# Patient Record
Sex: FEMALE | Race: OTHER | NOT HISPANIC OR LATINO | Employment: FULL TIME | URBAN - METROPOLITAN AREA
[De-identification: names, ages, dates, MRNs, and addresses within clinical notes are randomized per-mention and may not be internally consistent; named-entity substitution may affect disease eponyms.]

---

## 2025-04-28 ENCOUNTER — ANCILLARY PROCEDURE (OUTPATIENT)
Dept: URGENT CARE | Age: 30
End: 2025-04-28
Payer: COMMERCIAL

## 2025-04-28 ENCOUNTER — OFFICE VISIT (OUTPATIENT)
Dept: URGENT CARE | Age: 30
End: 2025-04-28
Payer: COMMERCIAL

## 2025-04-28 ENCOUNTER — OFFICE VISIT (OUTPATIENT)
Dept: ORTHOPEDIC SURGERY | Facility: CLINIC | Age: 30
End: 2025-04-28
Payer: COMMERCIAL

## 2025-04-28 VITALS
OXYGEN SATURATION: 99 % | SYSTOLIC BLOOD PRESSURE: 104 MMHG | DIASTOLIC BLOOD PRESSURE: 69 MMHG | WEIGHT: 133.4 LBS | HEART RATE: 73 BPM | BODY MASS INDEX: 24.4 KG/M2

## 2025-04-28 DIAGNOSIS — S59.901A INJURY OF RIGHT ELBOW, INITIAL ENCOUNTER: ICD-10-CM

## 2025-04-28 DIAGNOSIS — S42.401A CLOSED FRACTURE OF RIGHT ELBOW, INITIAL ENCOUNTER: Primary | ICD-10-CM

## 2025-04-28 DIAGNOSIS — S52.125M: Primary | ICD-10-CM

## 2025-04-28 PROCEDURE — L3670 SO ACRO/CLAV CAN WEB PRE OTS: HCPCS | Performed by: ORTHOPAEDIC SURGERY

## 2025-04-28 PROCEDURE — 99204 OFFICE O/P NEW MOD 45 MIN: CPT | Performed by: ORTHOPAEDIC SURGERY

## 2025-04-28 PROCEDURE — A4565 SLINGS: HCPCS

## 2025-04-28 PROCEDURE — 1036F TOBACCO NON-USER: CPT | Performed by: ORTHOPAEDIC SURGERY

## 2025-04-28 PROCEDURE — 73080 X-RAY EXAM OF ELBOW: CPT | Mod: RIGHT SIDE

## 2025-04-28 PROCEDURE — 99214 OFFICE O/P EST MOD 30 MIN: CPT

## 2025-04-28 PROCEDURE — 99214 OFFICE O/P EST MOD 30 MIN: CPT | Performed by: ORTHOPAEDIC SURGERY

## 2025-04-28 RX ORDER — IBUPROFEN 800 MG/1
800 TABLET ORAL EVERY 8 HOURS PRN
Qty: 30 TABLET | Refills: 0 | Status: SHIPPED | OUTPATIENT
Start: 2025-04-28 | End: 2025-05-08

## 2025-04-28 NOTE — PROGRESS NOTES
"Chief Complaint   Chief Complaint   Patient presents with    Right Elbow - Pain         HPI:      Cody Morley is a pleasant 30 y.o. year-old female who is seen today for right elbow injury occurred on Wednesday no treatment so far fell on an outstretched hand moderately uncomfortable she is right-hand dominant works in a lab    Review of Systems all other body systems have been reviewed and are negative for complaint.  See intake sheet was reviewed and scanned into the chart    There were no vitals filed for this visit.    Medical History[1]  Problem List[2]    Medication Documentation Review Audit       Reviewed by Dennis Cedillo MA (Medical Assistant) on 04/28/25 at 1358      Medication Order Taking? Sig Documenting Provider Last Dose Status   ibuprofen 800 mg tablet [id number]  Take 1 tablet (800 mg) by mouth every 8 hours if needed for moderate pain (4 - 6) for up to 10 days. Skyler Henao PA-C  Active                    RX Allergies[3]    Social History     Socioeconomic History    Marital status: Single     Spouse name: Not on file    Number of children: Not on file    Years of education: Not on file    Highest education level: Not on file   Occupational History    Not on file   Tobacco Use    Smoking status: Never    Smokeless tobacco: Never   Substance and Sexual Activity    Alcohol use: Not on file    Drug use: Not on file    Sexual activity: Not on file   Other Topics Concern    Not on file   Social History Narrative    Not on file     Social Drivers of Health     Financial Resource Strain: Not on file   Food Insecurity: Not on file   Transportation Needs: Not on file   Physical Activity: Not on file   Stress: Not on file   Social Connections: Not on file   Intimate Partner Violence: Not on file   Housing Stability: Not on file       Surgical History[4]    There is no height or weight on file to calculate BMI.    HgA1c:   No results found for: \"HGBA1C\", \"LITGMGQY5K\"    Physical Exam:  Constitutional: " Well-developed well-nourished   Eyes: Sclerae anicteric, pupils equal and round  HENT: Normocephalic atraumatic  Cardiovascular: Pulses full, regular rate and rhythm  Respiratory: Breathing not labored, no wheezing  Integumentary: Skin intact, no lesions or rashes  Neurological: Sensation intact, no gross strength deficits, reflexes equal  Psychiatric: Alert oriented and appropriate  Hematologic/lymphatic: No lymphadenopathy  Right elbow: Mild swelling lacks about 30 degrees of extension and 20 Riese flexion she lacks 80% pronation supination          Imaging:  X-rays show a minimally displaced radial head fracture        Impression/Plan:  Radial head fracture  Protected in sling but work on range of motion limit lifting pushing off check new x-rays 7 to 10 days       [1] History reviewed. No pertinent past medical history.  [2] There is no problem list on file for this patient.  [3] No Known Allergies  [4] History reviewed. No pertinent surgical history.
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
## 2025-05-12 ENCOUNTER — HOSPITAL ENCOUNTER (OUTPATIENT)
Dept: RADIOLOGY | Facility: CLINIC | Age: 30
Discharge: HOME | End: 2025-05-12
Payer: COMMERCIAL

## 2025-05-12 ENCOUNTER — OFFICE VISIT (OUTPATIENT)
Dept: ORTHOPEDIC SURGERY | Facility: CLINIC | Age: 30
End: 2025-05-12
Payer: COMMERCIAL

## 2025-05-12 DIAGNOSIS — S52.125M: ICD-10-CM

## 2025-05-12 PROCEDURE — 73080 X-RAY EXAM OF ELBOW: CPT | Mod: RT

## 2025-05-12 PROCEDURE — 73080 X-RAY EXAM OF ELBOW: CPT | Mod: RIGHT SIDE | Performed by: RADIOLOGY

## 2025-05-12 PROCEDURE — 99214 OFFICE O/P EST MOD 30 MIN: CPT | Performed by: ORTHOPAEDIC SURGERY

## 2025-05-12 NOTE — PROGRESS NOTES
"Chief Complaint   Chief Complaint   Patient presents with    Right Elbow - Follow-up         HPI:      Cody Morley is a pleasant 30 y.o. year-old female who is seen today for right elbow injury occurred on . She has been treated in a splint with minimal range of motion at this point. She has noticed from arm fatigue from not using the arm.     Review of Systems all other body systems have been reviewed and are negative for complaint.    There were no vitals filed for this visit.    Medical History[1]  Problem List[2]    Medication Documentation Review Audit       Reviewed by Dennis Cedillo MA (Medical Assistant) on 25 at 1443      Medication Order Taking? Sig Documenting Provider Last Dose Status   ibuprofen 800 mg tablet [id number]  Take 1 tablet (800 mg) by mouth every 8 hours if needed for moderate pain (4 - 6) for up to 10 days. Skyler Henao PA-C   25 [id number]                    RX Allergies[3]    Social History     Socioeconomic History    Marital status: Single     Spouse name: Not on file    Number of children: Not on file    Years of education: Not on file    Highest education level: Not on file   Occupational History    Not on file   Tobacco Use    Smoking status: Never    Smokeless tobacco: Never   Substance and Sexual Activity    Alcohol use: Not on file    Drug use: Not on file    Sexual activity: Not on file   Other Topics Concern    Not on file   Social History Narrative    Not on file     Social Drivers of Health     Financial Resource Strain: Not on file   Food Insecurity: Not on file   Transportation Needs: Not on file   Physical Activity: Not on file   Stress: Not on file   Social Connections: Not on file   Intimate Partner Violence: Not on file   Housing Stability: Not on file       Surgical History[4]    There is no height or weight on file to calculate BMI.    HgA1c:   No results found for: \"HGBA1C\", \"[id number]\"    Physical Exam:  Constitutional: Well-developed " well-nourished   Eyes: Sclerae anicteric, pupils equal and round  HENT: Normocephalic atraumatic  Cardiovascular: Pulses full, regular rate and rhythm  Respiratory: Breathing not labored, no wheezing  Integumentary: Skin intact, no lesions or rashes  Neurological: Sensation intact, no gross strength deficits, reflexes equal  Psychiatric: Alert oriented and appropriate  Hematologic/lymphatic: No lymphadenopathy  Right elbow: Extension to 15, flexion to 100 with assistance from other arm, pronation to 70, supination to 60      Imaging:  X-rays show a minimally displaced radial head fracture without further displacement        Impression/Plan:  Radial head fracture  Protected in sling, start PT to work on regaining range of motion  RTC in three weeks        [1] History reviewed. No pertinent past medical history.  [2] There is no problem list on file for this patient.   [3] No Known Allergies  [4] History reviewed. No pertinent surgical history.

## 2025-06-09 ENCOUNTER — APPOINTMENT (OUTPATIENT)
Dept: ORTHOPEDIC SURGERY | Facility: CLINIC | Age: 30
End: 2025-06-09
Payer: COMMERCIAL

## 2025-06-23 ENCOUNTER — HOSPITAL ENCOUNTER (OUTPATIENT)
Dept: RADIOLOGY | Facility: CLINIC | Age: 30
Discharge: HOME | End: 2025-06-23
Payer: COMMERCIAL

## 2025-06-23 ENCOUNTER — APPOINTMENT (OUTPATIENT)
Dept: ORTHOPEDIC SURGERY | Facility: CLINIC | Age: 30
End: 2025-06-23
Payer: COMMERCIAL

## 2025-06-23 DIAGNOSIS — S52.125M: ICD-10-CM

## 2025-06-23 PROCEDURE — 1036F TOBACCO NON-USER: CPT | Performed by: ORTHOPAEDIC SURGERY

## 2025-06-23 PROCEDURE — 99213 OFFICE O/P EST LOW 20 MIN: CPT | Performed by: ORTHOPAEDIC SURGERY

## 2025-06-23 PROCEDURE — 73080 X-RAY EXAM OF ELBOW: CPT | Mod: RIGHT SIDE | Performed by: RADIOLOGY

## 2025-06-23 PROCEDURE — 73080 X-RAY EXAM OF ELBOW: CPT | Mod: RT

## 2025-06-23 NOTE — PROGRESS NOTES
Follow-up right radial head fracture elbow is feeling much better  No change in interval medical history  Examination  Constitutional: Well-developed well-nourished   Eyes: Sclerae anicteric, pupils equal and round  HENT: Normocephalic atraumatic  Cardiovascular: Pulses full, regular rate and rhythm  Respiratory: Breathing not labored, no wheezing  Integumentary: Skin intact, no lesions or rashes  Neurological: Sensation intact, no gross strength deficits, reflexes equal  Psychiatric: Alert oriented and appropriate  Hematologic/lymphatic: No lymphadenopathy  Right elbow: Has full range of motion including flexion extension pronation supination  Review of x-rays show interval healing of radial head fracture which is minimally displaced assessment clinically healed she may advance activity as tolerated follow-up as needed